# Patient Record
Sex: FEMALE | Race: WHITE | NOT HISPANIC OR LATINO | URBAN - METROPOLITAN AREA
[De-identification: names, ages, dates, MRNs, and addresses within clinical notes are randomized per-mention and may not be internally consistent; named-entity substitution may affect disease eponyms.]

---

## 2023-02-27 PROBLEM — Z90.721 S/P RIGHT OOPHORECTOMY: Status: ACTIVE | Noted: 2023-02-27

## 2023-02-27 PROBLEM — N83.519 OVARIAN TORSION: Status: ACTIVE | Noted: 2023-02-27

## 2023-02-27 PROBLEM — R10.2 ACUTE PELVIC PAIN: Status: ACTIVE | Noted: 2023-02-27

## 2023-02-27 PROBLEM — Z98.890 STATUS POST LAPAROSCOPY: Status: ACTIVE | Noted: 2023-02-27

## 2023-03-08 ENCOUNTER — SOCIAL WORK (OUTPATIENT)
Dept: ADMINISTRATIVE | Facility: OTHER | Age: 38
End: 2023-03-08

## 2023-03-08 NOTE — PROGRESS NOTES
SW received pt's Disability paperwork via epic.. SW completed the demographic sheet/attached clinical note on pt's Disability paperwork and gave the paperwork to MD for review/signature. SW later received pt's completed Disability Paperwork from MD and e mail to the pt at her e mail address in [employer]. Pt notify([phone])regarding the above. SW scanned paperwork into epic. No other needs identified at this time.    Alison Walker,MSW  Pager#[phone]

## 2023-03-15 PROBLEM — N83.519 OVARIAN TORSION: Status: RESOLVED | Noted: 2023-02-27 | Resolved: 2023-03-15

## 2023-03-15 PROBLEM — R10.2 ACUTE PELVIC PAIN: Status: RESOLVED | Noted: 2023-02-27 | Resolved: 2023-03-15
